# Patient Record
Sex: MALE | Race: WHITE
[De-identification: names, ages, dates, MRNs, and addresses within clinical notes are randomized per-mention and may not be internally consistent; named-entity substitution may affect disease eponyms.]

---

## 2018-04-26 ENCOUNTER — HOSPITAL ENCOUNTER (OUTPATIENT)
Dept: HOSPITAL 88 - DX | Age: 68
End: 2018-04-26
Attending: INTERNAL MEDICINE
Payer: MEDICARE

## 2018-04-26 DIAGNOSIS — R10.11: Primary | ICD-10-CM

## 2018-04-26 PROCEDURE — 74250 X-RAY XM SM INT 1CNTRST STD: CPT

## 2018-04-26 NOTE — DIAGNOSTIC IMAGING REPORT
PROCEDURE:SMALL BOWEL SERIES

COMPARISON:None.

INDICATIONS:BILATERAL UPPER ABDOMEN PAIN 

 

TEQUNIQUE: A  radiograph was performed. The patient was given 

barium to drink and sequential images of the abdomen were obtained 

through 3 hours until the contrast had reached the colon. Spot images 

of the small bowel and terminal ileum were obtained.

 

Fluoro time: 1.3 minutes

Fluoroscopic dose: 43.50 microGy

 

FINDINGS:

Unremarkable bowel gas pattern. No dilated bowel loops. Cholecystectomy 

clips are present.

 

Small bowel: 

Normal motility and caliber

No evidence of mass or mucosal abnormality. Also fold pattern appears 

normal. No evidence of effusion.

Normal terminal ileum.

 

CONCLUSION:

Normal small bowel follow-through.

 

Dictated by:  Reinier Montiel M.D. on 4/26/2018 at 12:58     

Electronically approved by:  Reinier Montiel M.D. on 4/26/2018 at 

12:58

## 2018-05-09 ENCOUNTER — HOSPITAL ENCOUNTER (OUTPATIENT)
Dept: HOSPITAL 88 - MRI | Age: 68
End: 2018-05-09
Attending: INTERNAL MEDICINE
Payer: MEDICARE

## 2018-05-09 DIAGNOSIS — K22.70: Primary | ICD-10-CM

## 2018-05-09 LAB
BUN SERPL-MCNC: 9 MG/DL (ref 7–26)
BUN/CREAT SERPL: 8 (ref 6–25)
EGFRCR SERPLBLD CKD-EPI 2021: > 60 ML/MIN (ref 60–?)

## 2018-05-09 PROCEDURE — 84520 ASSAY OF UREA NITROGEN: CPT

## 2018-05-09 PROCEDURE — 36415 COLL VENOUS BLD VENIPUNCTURE: CPT

## 2018-05-09 PROCEDURE — 74183 MRI ABD W/O CNTR FLWD CNTR: CPT

## 2018-05-09 PROCEDURE — 82565 ASSAY OF CREATININE: CPT

## 2018-05-09 NOTE — DIAGNOSTIC IMAGING REPORT
EXAM: MRI of the abdomen with and without contrast.



INDICATION: Abdominal pain.



COMPARISON:   None.



TECHNIQUE: Multiplanar and multisequence imaging was performed of the abdomen.

T1 and T2-weighted images were obtained with and without contrast. T1-weighted

in and out-of-phase , Dynamic, post gadolinium T1-weighted spoiled gradient

echo scans. 



IV Contrast: 13 cc of MultiHance

Oral Contrast: None.

Medications: None



DISCUSSION:



Very limited study due to artifacts and motion.



LOWER THORAX: Borderline cardiomegaly.



HEPATOBILIARY:      No focal hepatic lesions. Common bile duct distention up to

0.9 cm, likely due to postcholecystectomy reservoir effects. 



GALLBLADDER: Surgically absent.



SPLEEN: No splenomegaly. 



PANCREAS: Atrophic. No focal masses or ductal dilatation.  



ADRENALS: No adrenal nodules    



KIDNEYS/URETERS: Kidneys enhance symmetrically.  No hydronephrosis. Multiple

right renal cysts, the largest in the inferior pole measuring 4.8 x 4.7 cm. The

posterior midpole 1.2 cm T1 and T2 hyperintense lesion without definite signs

of enhancement on subtraction postcontrast images, representing a hemorrhagic

cyst (series 5, image 21).     



GI TRACT: Visualized bowel loops are grossly unremarkable. No evidence of bowel

obstruction.

Appendix is visualized on coronal images and appears unremarkable.



LYMPH NODES: No lymphadenopathy.



VESSELS: Moderate to severe atherosclerotic disease of abdominal aorta.



PERITONEUM / RETROPERITONEUM: No free air or fluid.



BONES: Unremarkable. Degenerative changes of lumbar spine.



SOFT TISSUES: Unremarkable.           



IMPRESSION:

1.  Very limited study.

2.  No definite evidence of acute inflammatory process in the abdomen/pelvis.

If clinical symptoms persist, consider obtaining CT with contrast for further

evaluation.

3.  Status post cholecystectomy with mild common bile duct dilatation, likely

due to reservoir effects.

4.  Multiple right renal cysts as described above.



Signed by: Dr. Preston Mahoney MD on 5/9/2018 11:49 AM

## 2018-10-01 ENCOUNTER — HOSPITAL ENCOUNTER (OUTPATIENT)
Dept: HOSPITAL 88 - ER | Age: 68
Discharge: HOME | End: 2018-10-01
Attending: PLASTIC SURGERY
Payer: MEDICARE

## 2018-10-01 VITALS — SYSTOLIC BLOOD PRESSURE: 147 MMHG | DIASTOLIC BLOOD PRESSURE: 62 MMHG

## 2018-10-01 VITALS — BODY MASS INDEX: 21.97 KG/M2 | WEIGHT: 140 LBS | HEIGHT: 67 IN
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
## 2018-10-01 DIAGNOSIS — F17.210: ICD-10-CM

## 2018-10-01 DIAGNOSIS — Z95.1: ICD-10-CM

## 2018-10-01 DIAGNOSIS — E03.9: ICD-10-CM

## 2018-10-01 DIAGNOSIS — S62.630A: ICD-10-CM

## 2018-10-01 DIAGNOSIS — S68.021A: Primary | ICD-10-CM
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
## 2018-10-01 DIAGNOSIS — I25.2: ICD-10-CM

## 2018-10-01 PROCEDURE — 11730 AVULSION NAIL PLATE SIMPLE 1: CPT

## 2018-10-01 PROCEDURE — 73140 X-RAY EXAM OF FINGER(S): CPT

## 2018-10-01 PROCEDURE — 26765 TREAT FINGER FRACTURE EACH: CPT

## 2018-10-01 PROCEDURE — 99284 EMERGENCY DEPT VISIT MOD MDM: CPT

## 2018-10-01 NOTE — OPERATIVE REPORT
DATE OF PROCEDURE:  October 01, 2018 

 

PREOPERATIVE DIAGNOSES

1. Table saw injury, right thumb.  

2. Incomplete amputation, right thumb, distal phalanx.  

3. Open fracture in distal phalanx.



POSTOPERATIVE DIAGNOSES

1. Table saw injury, right thumb.  

2. Incomplete amputation, right thumb, distal phalanx.  

3. Open fracture in distal phalanx.



PROCEDURES PERFORMED: 

1. Debridement of distal phalanx, soft tissues.  

2. Repair of saw injury, right thumb, incomplete amputation.  



ANESTHESIA:  General. 



HISTORY:   The patient is a 67-year-old right hand dominant male who 

sustained a table saw injury to the right thumb last evening.  He did not 

come to the emergency room until today around noon.   He was seen urgently 

and evaluation was performed.  It was noted that the tissues were 

vascularized, but dusky, and he is now being taken to the OR urgently for 

repair of incomplete amputation/table saw injury of right thumb.  Risks, 

benefits and alternatives of treatment were discussed with the patient.  He 

is prepared to undergo the procedure as outlined.



DETAILS OF PROCEDURE:  Patient was marked preoperatively in the holding 

area.  He was brought to the operating theater and after the induction of 

adequate general anesthesia he was prepped and draped in a supine position. 

 A time out was performed.  The right upper extremity was exsanguinated and 

tourniquet was inflated to a pressure of 250 mmHg.  The procedure was begun 

by sharply debriding all the devitalized soft tissues on the volar aspect 

of the distal phalanx from the IP flexion crease all the way to the distal 

tip.  At this point, copious irrigation was done to remove all particulate 

wood and bony fragments.  Once this was performed, the C-arm fluoroscope 

was brought in and the major fracture fragments are aligned as best 

possible and transfixed with 0.28  K wires transversely.  At this point, 

the soft tissues were then repaired using 5-0 nylon in interrupted fashion. 

 Once the volar soft tissues had been repaired, attention was turned to the 

nail bed.  The nail plate was completely transected sagittally in its 

midline.  The nail plate was removed and the nail bed was then gently 

debrided of all devitalized tissue.  A 5-0 chromic was then used in 

interrupted fashion to approximate the nail bed as well as the germinal 

matrix underneath the eponychial fold.  An INRO nail stent was then 

fabricated and secured to the eponychial fold in the distal tip of the 

thumb using 5-0 nylon suture.  The tourniquet was deflated.  The thumb 

pinked up and the wound was noted to be hemostatic.  Bactroban ointment, 

Xeroform gauze was then placed on the thumb.  Sterile dressings were 

applied and then a foam aluminum splint was added for external protection 

and held in place with Coban.  The patient tolerated the procedure well.  

Estimated blood loss during the procedure was negligible.  A 0.5% plain 

Marcaine field block was performed at the base of the right thumb for 

postoperative analgesia.  Approximately 6 mL was used.  Patient was 

returned to recovery room in satisfactory condition and discharged with a 

postoperative instruction sheet as well as a followup appointment. 











DD:  10/01/2018 17:55

DT:  10/01/2018 18:16

Job#:  [id number]

## 2018-10-01 NOTE — DIAGNOSTIC IMAGING REPORT
Exam:  Right thumb 3 views



History:  Injury



Comparison: None.



Findings: 



Acute markedly comminuted fracture of the entirety of the first distal phalanx

with extension to the interphalangeal joint. Overlying soft tissue lacerations.

The proximal phalanx appears intact.



Impression:



Markedly comminuted fracture of the first distal phalanx with associated soft

tissue laceration.





Signed by: Dr. Trevor Campbell M.D. on 10/1/2018 11:25 AM

## 2022-08-14 ENCOUNTER — HOSPITAL ENCOUNTER (EMERGENCY)
Dept: HOSPITAL 88 - ER | Age: 72
Discharge: HOME | End: 2022-08-14
Payer: MEDICARE

## 2022-08-14 VITALS — HEIGHT: 67 IN | WEIGHT: 140 LBS | BODY MASS INDEX: 21.97 KG/M2

## 2022-08-14 DIAGNOSIS — F17.210: ICD-10-CM

## 2022-08-14 DIAGNOSIS — E78.5: ICD-10-CM

## 2022-08-14 DIAGNOSIS — R05.9: ICD-10-CM

## 2022-08-14 DIAGNOSIS — F41.9: ICD-10-CM

## 2022-08-14 DIAGNOSIS — J40: ICD-10-CM

## 2022-08-14 DIAGNOSIS — Z87.01: ICD-10-CM

## 2022-08-14 DIAGNOSIS — I10: ICD-10-CM

## 2022-08-14 DIAGNOSIS — Z95.1: ICD-10-CM

## 2022-08-14 DIAGNOSIS — R06.02: Primary | ICD-10-CM

## 2022-08-14 DIAGNOSIS — J44.1: ICD-10-CM

## 2022-08-14 LAB
ALBUMIN SERPL-MCNC: 2.9 G/DL (ref 3.5–5)
ALBUMIN/GLOB SERPL: 0.8 {RATIO} (ref 0.8–2)
ALP SERPL-CCNC: 84 IU/L (ref 40–150)
ALT SERPL-CCNC: 18 IU/L (ref 0–55)
ANION GAP SERPL CALC-SCNC: 13.2 MMOL/L (ref 8–16)
BASOPHILS # BLD AUTO: 0 10*3/UL (ref 0–0.1)
BASOPHILS NFR BLD AUTO: 0.2 % (ref 0–1)
BUN SERPL-MCNC: 21 MG/DL (ref 7–26)
BUN/CREAT SERPL: 18 (ref 6–25)
CALCIUM SERPL-MCNC: 8.6 MG/DL (ref 8.4–10.2)
CHLORIDE SERPL-SCNC: 99 MMOL/L (ref 98–107)
CK MB SERPL-MCNC: 2.1 NG/ML (ref 0–5)
CK SERPL-CCNC: 39 IU/L (ref 30–200)
CO2 SERPL-SCNC: 28 MMOL/L (ref 22–29)
DEPRECATED APTT PLAS QN: 30 SECONDS (ref 23.8–35.5)
DEPRECATED INR PLAS: 0.98
DEPRECATED NEUTROPHILS # BLD AUTO: 8.8 10*3/UL (ref 2.1–6.9)
EOSINOPHIL # BLD AUTO: 0.2 10*3/UL (ref 0–0.4)
EOSINOPHIL NFR BLD AUTO: 1.3 % (ref 0–6)
ERYTHROCYTE [DISTWIDTH] IN CORD BLOOD: 12.4 % (ref 11.7–14.4)
GLOBULIN PLAS-MCNC: 3.8 G/DL (ref 2.3–3.5)
GLUCOSE SERPLBLD-MCNC: 108 MG/DL (ref 74–118)
HCT VFR BLD AUTO: 41 % (ref 38.2–49.6)
HGB BLD-MCNC: 13.6 G/DL (ref 14–18)
LYMPHOCYTES # BLD: 1.2 10*3/UL (ref 1–3.2)
LYMPHOCYTES NFR BLD AUTO: 10.8 % (ref 18–39.1)
MCH RBC QN AUTO: 31.3 PG (ref 28–32)
MCHC RBC AUTO-ENTMCNC: 33.2 G/DL (ref 31–35)
MCV RBC AUTO: 94.5 FL (ref 81–99)
MONOCYTES # BLD AUTO: 1 10*3/UL (ref 0.2–0.8)
MONOCYTES NFR BLD AUTO: 8.5 % (ref 4.4–11.3)
NEUTS SEG NFR BLD AUTO: 78.6 % (ref 38.7–80)
PLATELET # BLD AUTO: 356 X10E3/UL (ref 140–360)
POTASSIUM SERPL-SCNC: 4.2 MMOL/L (ref 3.5–5.1)
PROTHROMBIN TIME: 13.9 SECONDS (ref 11.9–14.5)
RBC # BLD AUTO: 4.34 X10E6/UL (ref 4.3–5.7)
SODIUM SERPL-SCNC: 136 MMOL/L (ref 136–145)

## 2022-08-14 PROCEDURE — 0223U NFCT DS 22 TRGT SARS-COV-2: CPT

## 2022-08-14 PROCEDURE — 82550 ASSAY OF CK (CPK): CPT

## 2022-08-14 PROCEDURE — 84484 ASSAY OF TROPONIN QUANT: CPT

## 2022-08-14 PROCEDURE — 80053 COMPREHEN METABOLIC PANEL: CPT

## 2022-08-14 PROCEDURE — 94799 UNLISTED PULMONARY SVC/PX: CPT

## 2022-08-14 PROCEDURE — 36415 COLL VENOUS BLD VENIPUNCTURE: CPT

## 2022-08-14 PROCEDURE — 93005 ELECTROCARDIOGRAM TRACING: CPT

## 2022-08-14 PROCEDURE — 71045 X-RAY EXAM CHEST 1 VIEW: CPT

## 2022-08-14 PROCEDURE — 85025 COMPLETE CBC W/AUTO DIFF WBC: CPT

## 2022-08-14 PROCEDURE — 94640 AIRWAY INHALATION TREATMENT: CPT

## 2022-08-14 PROCEDURE — 82553 CREATINE MB FRACTION: CPT

## 2022-08-14 PROCEDURE — 85730 THROMBOPLASTIN TIME PARTIAL: CPT

## 2022-08-14 PROCEDURE — 85379 FIBRIN DEGRADATION QUANT: CPT

## 2022-08-14 PROCEDURE — 85610 PROTHROMBIN TIME: CPT

## 2022-08-14 PROCEDURE — 83880 ASSAY OF NATRIURETIC PEPTIDE: CPT

## 2022-08-14 PROCEDURE — 71260 CT THORAX DX C+: CPT

## 2022-08-14 PROCEDURE — 99284 EMERGENCY DEPT VISIT MOD MDM: CPT

## 2022-08-14 PROCEDURE — 87040 BLOOD CULTURE FOR BACTERIA: CPT

## 2022-08-14 PROCEDURE — 83605 ASSAY OF LACTIC ACID: CPT

## 2023-05-02 ENCOUNTER — HOSPITAL ENCOUNTER (INPATIENT)
Dept: HOSPITAL 88 - ER | Age: 73
LOS: 1 days | Discharge: HOME | DRG: 191 | End: 2023-05-03
Attending: STUDENT IN AN ORGANIZED HEALTH CARE EDUCATION/TRAINING PROGRAM | Admitting: STUDENT IN AN ORGANIZED HEALTH CARE EDUCATION/TRAINING PROGRAM
Payer: MEDICARE

## 2023-05-02 VITALS — HEIGHT: 67 IN | WEIGHT: 140 LBS | BODY MASS INDEX: 21.97 KG/M2

## 2023-05-02 VITALS — DIASTOLIC BLOOD PRESSURE: 65 MMHG | SYSTOLIC BLOOD PRESSURE: 98 MMHG

## 2023-05-02 DIAGNOSIS — J44.1: Primary | ICD-10-CM

## 2023-05-02 DIAGNOSIS — I25.10: ICD-10-CM

## 2023-05-02 DIAGNOSIS — I11.0: ICD-10-CM

## 2023-05-02 DIAGNOSIS — I25.2: ICD-10-CM

## 2023-05-02 DIAGNOSIS — I50.9: ICD-10-CM

## 2023-05-02 DIAGNOSIS — Z95.1: ICD-10-CM

## 2023-05-02 DIAGNOSIS — Z87.891: ICD-10-CM

## 2023-05-02 DIAGNOSIS — F41.9: ICD-10-CM

## 2023-05-02 DIAGNOSIS — Z90.49: ICD-10-CM

## 2023-05-02 DIAGNOSIS — F32.9: ICD-10-CM

## 2023-05-02 DIAGNOSIS — I48.91: ICD-10-CM

## 2023-05-02 DIAGNOSIS — E86.0: ICD-10-CM

## 2023-05-02 DIAGNOSIS — Z20.822: ICD-10-CM

## 2023-05-02 DIAGNOSIS — E78.5: ICD-10-CM

## 2023-05-02 DIAGNOSIS — D64.9: ICD-10-CM

## 2023-05-02 DIAGNOSIS — Z79.02: ICD-10-CM

## 2023-05-02 DIAGNOSIS — N17.9: ICD-10-CM

## 2023-05-02 DIAGNOSIS — Z79.51: ICD-10-CM

## 2023-05-02 LAB
ALBUMIN SERPL-MCNC: 3.3 G/DL (ref 3.5–5)
ALBUMIN/GLOB SERPL: 0.9 {RATIO} (ref 0.8–2)
ALP SERPL-CCNC: 62 IU/L (ref 40–150)
ALT SERPL-CCNC: 23 IU/L (ref 0–55)
ANION GAP SERPL CALC-SCNC: 15.2 MMOL/L (ref 8–16)
BASOPHILS # BLD AUTO: 0 10*3/UL (ref 0–0.1)
BASOPHILS NFR BLD AUTO: 0.3 % (ref 0–1)
BUN SERPL-MCNC: 22 MG/DL (ref 7–26)
BUN/CREAT SERPL: 15 (ref 6–25)
CALCIUM SERPL-MCNC: 9.1 MG/DL (ref 8.4–10.2)
CHLORIDE SERPL-SCNC: 102 MMOL/L (ref 98–107)
CK MB SERPL-MCNC: 6.6 NG/ML (ref 0–5)
CK SERPL-CCNC: 307 IU/L (ref 30–200)
CO2 SERPL-SCNC: 26 MMOL/L (ref 22–29)
DEPRECATED NEUTROPHILS # BLD AUTO: 6.9 10*3/UL (ref 2.1–6.9)
EOSINOPHIL # BLD AUTO: 0.1 10*3/UL (ref 0–0.4)
EOSINOPHIL NFR BLD AUTO: 1.2 % (ref 0–6)
ERYTHROCYTE [DISTWIDTH] IN CORD BLOOD: 12.2 % (ref 11.7–14.4)
GLOBULIN PLAS-MCNC: 3.8 G/DL (ref 2.3–3.5)
GLUCOSE SERPLBLD-MCNC: 110 MG/DL (ref 74–118)
HCT VFR BLD AUTO: 35.9 % (ref 38.2–49.6)
HGB BLD-MCNC: 12.3 G/DL (ref 14–18)
LYMPHOCYTES # BLD: 1 10*3/UL (ref 1–3.2)
LYMPHOCYTES NFR BLD AUTO: 10.9 % (ref 18–39.1)
MAGNESIUM SERPL-MCNC: 1.8 MG/DL (ref 1.3–2.1)
MCH RBC QN AUTO: 32.8 PG (ref 28–32)
MCHC RBC AUTO-ENTMCNC: 34.3 G/DL (ref 31–35)
MCV RBC AUTO: 95.7 FL (ref 81–99)
MONOCYTES # BLD AUTO: 1.2 10*3/UL (ref 0.2–0.8)
MONOCYTES NFR BLD AUTO: 12.6 % (ref 4.4–11.3)
NEUTS SEG NFR BLD AUTO: 74.5 % (ref 38.7–80)
PLATELET # BLD AUTO: 211 X10E3/UL (ref 140–360)
POTASSIUM SERPL-SCNC: 4.2 MMOL/L (ref 3.5–5.1)
RBC # BLD AUTO: 3.75 X10E6/UL (ref 4.3–5.7)
SODIUM SERPL-SCNC: 139 MMOL/L (ref 136–145)

## 2023-05-02 PROCEDURE — 94640 AIRWAY INHALATION TREATMENT: CPT

## 2023-05-02 PROCEDURE — 71045 X-RAY EXAM CHEST 1 VIEW: CPT

## 2023-05-02 PROCEDURE — 71260 CT THORAX DX C+: CPT

## 2023-05-02 PROCEDURE — 82550 ASSAY OF CK (CPK): CPT

## 2023-05-02 PROCEDURE — 94799 UNLISTED PULMONARY SVC/PX: CPT

## 2023-05-02 PROCEDURE — 36415 COLL VENOUS BLD VENIPUNCTURE: CPT

## 2023-05-02 PROCEDURE — 84484 ASSAY OF TROPONIN QUANT: CPT

## 2023-05-02 PROCEDURE — 80053 COMPREHEN METABOLIC PANEL: CPT

## 2023-05-02 PROCEDURE — 83735 ASSAY OF MAGNESIUM: CPT

## 2023-05-02 PROCEDURE — 83880 ASSAY OF NATRIURETIC PEPTIDE: CPT

## 2023-05-02 PROCEDURE — 82553 CREATINE MB FRACTION: CPT

## 2023-05-02 PROCEDURE — 99284 EMERGENCY DEPT VISIT MOD MDM: CPT

## 2023-05-02 PROCEDURE — 93005 ELECTROCARDIOGRAM TRACING: CPT

## 2023-05-02 PROCEDURE — 85025 COMPLETE CBC W/AUTO DIFF WBC: CPT

## 2023-05-02 RX ADMIN — Medication SCH ML: at 23:15

## 2023-05-03 VITALS — SYSTOLIC BLOOD PRESSURE: 80 MMHG | DIASTOLIC BLOOD PRESSURE: 64 MMHG

## 2023-05-03 VITALS — DIASTOLIC BLOOD PRESSURE: 67 MMHG | SYSTOLIC BLOOD PRESSURE: 103 MMHG

## 2023-05-03 VITALS — SYSTOLIC BLOOD PRESSURE: 100 MMHG | DIASTOLIC BLOOD PRESSURE: 56 MMHG

## 2023-05-03 LAB
ALBUMIN SERPL-MCNC: 2.6 G/DL (ref 3.5–5)
ALBUMIN/GLOB SERPL: 0.8 {RATIO} (ref 0.8–2)
ALP SERPL-CCNC: 54 IU/L (ref 40–150)
ALT SERPL-CCNC: 21 IU/L (ref 0–55)
ANION GAP SERPL CALC-SCNC: 11.4 MMOL/L (ref 8–16)
BASOPHILS # BLD AUTO: 0 10*3/UL (ref 0–0.1)
BASOPHILS NFR BLD AUTO: 0.4 % (ref 0–1)
BUN SERPL-MCNC: 21 MG/DL (ref 7–26)
BUN/CREAT SERPL: 18 (ref 6–25)
CALCIUM SERPL-MCNC: 8.2 MG/DL (ref 8.4–10.2)
CHLORIDE SERPL-SCNC: 108 MMOL/L (ref 98–107)
CK MB SERPL-MCNC: 6.2 NG/ML (ref 0–5)
CK SERPL-CCNC: 237 IU/L (ref 30–200)
CO2 SERPL-SCNC: 22 MMOL/L (ref 22–29)
DEPRECATED NEUTROPHILS # BLD AUTO: 6.6 10*3/UL (ref 2.1–6.9)
EOSINOPHIL # BLD AUTO: 0 10*3/UL (ref 0–0.4)
EOSINOPHIL NFR BLD AUTO: 0 % (ref 0–6)
ERYTHROCYTE [DISTWIDTH] IN CORD BLOOD: 11.9 % (ref 11.7–14.4)
GLOBULIN PLAS-MCNC: 3.2 G/DL (ref 2.3–3.5)
GLUCOSE SERPLBLD-MCNC: 141 MG/DL (ref 74–118)
HCT VFR BLD AUTO: 32.1 % (ref 38.2–49.6)
HGB BLD-MCNC: 10.8 G/DL (ref 14–18)
LYMPHOCYTES # BLD: 0.7 10*3/UL (ref 1–3.2)
LYMPHOCYTES NFR BLD AUTO: 9.2 % (ref 18–39.1)
MCH RBC QN AUTO: 32.6 PG (ref 28–32)
MCHC RBC AUTO-ENTMCNC: 33.6 G/DL (ref 31–35)
MCV RBC AUTO: 97 FL (ref 81–99)
MONOCYTES # BLD AUTO: 0.6 10*3/UL (ref 0.2–0.8)
MONOCYTES NFR BLD AUTO: 7.8 % (ref 4.4–11.3)
NEUTS SEG NFR BLD AUTO: 81.4 % (ref 38.7–80)
PLATELET # BLD AUTO: 197 X10E3/UL (ref 140–360)
POTASSIUM SERPL-SCNC: 4.4 MMOL/L (ref 3.5–5.1)
RBC # BLD AUTO: 3.31 X10E6/UL (ref 4.3–5.7)
SODIUM SERPL-SCNC: 137 MMOL/L (ref 136–145)

## 2023-05-03 RX ADMIN — Medication SCH ML: at 07:43

## 2023-05-03 RX ADMIN — Medication SCH ML: at 12:46

## 2023-05-06 ENCOUNTER — HOSPITAL ENCOUNTER (INPATIENT)
Dept: HOSPITAL 88 - ER | Age: 73
LOS: 3 days | Discharge: HOME | DRG: 190 | End: 2023-05-09
Attending: INTERNAL MEDICINE | Admitting: INTERNAL MEDICINE
Payer: MEDICARE

## 2023-05-06 VITALS
HEART RATE: 80 BPM | RESPIRATION RATE: 18 BRPM | DIASTOLIC BLOOD PRESSURE: 63 MMHG | SYSTOLIC BLOOD PRESSURE: 100 MMHG | OXYGEN SATURATION: 100 % | TEMPERATURE: 97.6 F

## 2023-05-06 VITALS — HEIGHT: 66 IN | BODY MASS INDEX: 22.5 KG/M2 | WEIGHT: 140 LBS

## 2023-05-06 VITALS — OXYGEN SATURATION: 98 % | HEART RATE: 96 BPM | RESPIRATION RATE: 22 BRPM

## 2023-05-06 VITALS
DIASTOLIC BLOOD PRESSURE: 63 MMHG | TEMPERATURE: 97.6 F | HEART RATE: 80 BPM | OXYGEN SATURATION: 100 % | RESPIRATION RATE: 18 BRPM | SYSTOLIC BLOOD PRESSURE: 100 MMHG

## 2023-05-06 VITALS
SYSTOLIC BLOOD PRESSURE: 100 MMHG | DIASTOLIC BLOOD PRESSURE: 63 MMHG | RESPIRATION RATE: 18 BRPM | TEMPERATURE: 97.6 F | HEART RATE: 80 BPM | OXYGEN SATURATION: 96 %

## 2023-05-06 DIAGNOSIS — Z87.891: ICD-10-CM

## 2023-05-06 DIAGNOSIS — Z95.1: ICD-10-CM

## 2023-05-06 DIAGNOSIS — R00.0: ICD-10-CM

## 2023-05-06 DIAGNOSIS — Z90.49: ICD-10-CM

## 2023-05-06 DIAGNOSIS — D63.1: ICD-10-CM

## 2023-05-06 DIAGNOSIS — I13.0: ICD-10-CM

## 2023-05-06 DIAGNOSIS — N18.2: ICD-10-CM

## 2023-05-06 DIAGNOSIS — J22: ICD-10-CM

## 2023-05-06 DIAGNOSIS — J96.90: ICD-10-CM

## 2023-05-06 DIAGNOSIS — J18.9: ICD-10-CM

## 2023-05-06 DIAGNOSIS — E78.5: ICD-10-CM

## 2023-05-06 DIAGNOSIS — I50.33: ICD-10-CM

## 2023-05-06 DIAGNOSIS — I48.0: ICD-10-CM

## 2023-05-06 DIAGNOSIS — E03.9: ICD-10-CM

## 2023-05-06 DIAGNOSIS — I25.10: ICD-10-CM

## 2023-05-06 DIAGNOSIS — J43.9: Primary | ICD-10-CM

## 2023-05-06 DIAGNOSIS — I71.9: ICD-10-CM

## 2023-05-06 DIAGNOSIS — F32.A: ICD-10-CM

## 2023-05-06 DIAGNOSIS — F41.9: ICD-10-CM

## 2023-05-06 DIAGNOSIS — I25.2: ICD-10-CM

## 2023-05-06 LAB
ALBUMIN SERPL-MCNC: 3.1 G/DL (ref 3.5–5)
ALBUMIN/GLOB SERPL: 0.7 {RATIO} (ref 0.8–2)
ALP SERPL-CCNC: 59 IU/L (ref 40–150)
ALT SERPL-CCNC: 30 IU/L (ref 0–55)
ANION GAP SERPL CALC-SCNC: 16.4 MMOL/L (ref 8–16)
BASOPHILS # BLD AUTO: 0.1 10*3/UL (ref 0–0.1)
BASOPHILS NFR BLD AUTO: 0.3 % (ref 0–1)
BUN SERPL-MCNC: 16 MG/DL (ref 7–26)
BUN/CREAT SERPL: 12 (ref 6–25)
CALCIUM SERPL-MCNC: 9.6 MG/DL (ref 8.4–10.2)
CHLORIDE SERPL-SCNC: 100 MMOL/L (ref 98–107)
CK MB SERPL-MCNC: 5.6 NG/ML (ref 0–5)
CK SERPL-CCNC: 396 IU/L (ref 30–200)
CO2 SERPL-SCNC: 25 MMOL/L (ref 22–29)
DEPRECATED NEUTROPHILS # BLD AUTO: 15.6 10*3/UL (ref 2.1–6.9)
EOSINOPHIL # BLD AUTO: 0.1 10*3/UL (ref 0–0.4)
EOSINOPHIL NFR BLD AUTO: 0.3 % (ref 0–6)
ERYTHROCYTE [DISTWIDTH] IN CORD BLOOD: 12.7 % (ref 11.7–14.4)
GLOBULIN PLAS-MCNC: 4.3 G/DL (ref 2.3–3.5)
GLUCOSE SERPLBLD-MCNC: 98 MG/DL (ref 74–118)
HCT VFR BLD AUTO: 37.4 % (ref 38.2–49.6)
HGB BLD-MCNC: 12.5 G/DL (ref 14–18)
LYMPHOCYTES # BLD: 1.6 10*3/UL (ref 1–3.2)
LYMPHOCYTES NFR BLD AUTO: 8.5 % (ref 18–39.1)
MCH RBC QN AUTO: 32.5 PG (ref 28–32)
MCHC RBC AUTO-ENTMCNC: 33.4 G/DL (ref 31–35)
MCV RBC AUTO: 97.1 FL (ref 81–99)
MONOCYTES # BLD AUTO: 1 10*3/UL (ref 0.2–0.8)
MONOCYTES NFR BLD AUTO: 5.5 % (ref 4.4–11.3)
NEUTS SEG NFR BLD AUTO: 82.8 % (ref 38.7–80)
PLATELET # BLD AUTO: 287 X10E3/UL (ref 140–360)
POTASSIUM SERPL-SCNC: 4.4 MMOL/L (ref 3.5–5.1)
RBC # BLD AUTO: 3.85 X10E6/UL (ref 4.3–5.7)
SODIUM SERPL-SCNC: 137 MMOL/L (ref 136–145)

## 2023-05-06 PROCEDURE — 82550 ASSAY OF CK (CPK): CPT

## 2023-05-06 PROCEDURE — 99284 EMERGENCY DEPT VISIT MOD MDM: CPT

## 2023-05-06 PROCEDURE — 36415 COLL VENOUS BLD VENIPUNCTURE: CPT

## 2023-05-06 PROCEDURE — 93005 ELECTROCARDIOGRAM TRACING: CPT

## 2023-05-06 PROCEDURE — 82553 CREATINE MB FRACTION: CPT

## 2023-05-06 PROCEDURE — 87040 BLOOD CULTURE FOR BACTERIA: CPT

## 2023-05-06 PROCEDURE — 84484 ASSAY OF TROPONIN QUANT: CPT

## 2023-05-06 PROCEDURE — 71045 X-RAY EXAM CHEST 1 VIEW: CPT

## 2023-05-06 PROCEDURE — 94799 UNLISTED PULMONARY SVC/PX: CPT

## 2023-05-06 PROCEDURE — 80053 COMPREHEN METABOLIC PANEL: CPT

## 2023-05-06 PROCEDURE — 94640 AIRWAY INHALATION TREATMENT: CPT

## 2023-05-06 PROCEDURE — 93306 TTE W/DOPPLER COMPLETE: CPT

## 2023-05-06 PROCEDURE — 83880 ASSAY OF NATRIURETIC PEPTIDE: CPT

## 2023-05-06 PROCEDURE — 85025 COMPLETE CBC W/AUTO DIFF WBC: CPT

## 2023-05-06 PROCEDURE — 83605 ASSAY OF LACTIC ACID: CPT

## 2023-05-06 RX ADMIN — Medication SCH ML: at 22:28

## 2023-05-07 VITALS
TEMPERATURE: 97.6 F | RESPIRATION RATE: 21 BRPM | SYSTOLIC BLOOD PRESSURE: 149 MMHG | HEART RATE: 55 BPM | OXYGEN SATURATION: 97 % | DIASTOLIC BLOOD PRESSURE: 56 MMHG

## 2023-05-07 VITALS
OXYGEN SATURATION: 99 % | DIASTOLIC BLOOD PRESSURE: 42 MMHG | HEART RATE: 86 BPM | RESPIRATION RATE: 18 BRPM | SYSTOLIC BLOOD PRESSURE: 90 MMHG | TEMPERATURE: 97.9 F

## 2023-05-07 VITALS
HEART RATE: 82 BPM | SYSTOLIC BLOOD PRESSURE: 142 MMHG | OXYGEN SATURATION: 98 % | DIASTOLIC BLOOD PRESSURE: 62 MMHG | TEMPERATURE: 97.1 F | RESPIRATION RATE: 18 BRPM

## 2023-05-07 VITALS
SYSTOLIC BLOOD PRESSURE: 126 MMHG | OXYGEN SATURATION: 96 % | TEMPERATURE: 97.8 F | RESPIRATION RATE: 17 BRPM | DIASTOLIC BLOOD PRESSURE: 56 MMHG | HEART RATE: 70 BPM

## 2023-05-07 VITALS
TEMPERATURE: 97.6 F | DIASTOLIC BLOOD PRESSURE: 56 MMHG | SYSTOLIC BLOOD PRESSURE: 149 MMHG | OXYGEN SATURATION: 97 % | RESPIRATION RATE: 21 BRPM | HEART RATE: 58 BPM

## 2023-05-07 VITALS — RESPIRATION RATE: 18 BRPM | HEART RATE: 74 BPM | OXYGEN SATURATION: 100 %

## 2023-05-07 VITALS
HEART RATE: 71 BPM | OXYGEN SATURATION: 98 % | TEMPERATURE: 98.1 F | DIASTOLIC BLOOD PRESSURE: 63 MMHG | RESPIRATION RATE: 17 BRPM | SYSTOLIC BLOOD PRESSURE: 101 MMHG

## 2023-05-07 VITALS
RESPIRATION RATE: 18 BRPM | DIASTOLIC BLOOD PRESSURE: 56 MMHG | TEMPERATURE: 97.6 F | HEART RATE: 52 BPM | OXYGEN SATURATION: 96 % | SYSTOLIC BLOOD PRESSURE: 148 MMHG

## 2023-05-07 VITALS
SYSTOLIC BLOOD PRESSURE: 100 MMHG | HEART RATE: 80 BPM | RESPIRATION RATE: 18 BRPM | TEMPERATURE: 97.6 F | DIASTOLIC BLOOD PRESSURE: 63 MMHG | OXYGEN SATURATION: 96 %

## 2023-05-07 VITALS — OXYGEN SATURATION: 98 % | HEART RATE: 81 BPM | RESPIRATION RATE: 16 BRPM

## 2023-05-07 VITALS — HEART RATE: 91 BPM | OXYGEN SATURATION: 97 % | RESPIRATION RATE: 18 BRPM

## 2023-05-07 VITALS — RESPIRATION RATE: 18 BRPM | HEART RATE: 68 BPM | OXYGEN SATURATION: 98 %

## 2023-05-07 VITALS
OXYGEN SATURATION: 99 % | RESPIRATION RATE: 18 BRPM | DIASTOLIC BLOOD PRESSURE: 42 MMHG | TEMPERATURE: 97.9 F | SYSTOLIC BLOOD PRESSURE: 90 MMHG | HEART RATE: 86 BPM

## 2023-05-07 VITALS — RESPIRATION RATE: 20 BRPM | HEART RATE: 96 BPM | OXYGEN SATURATION: 96 %

## 2023-05-07 VITALS
SYSTOLIC BLOOD PRESSURE: 101 MMHG | DIASTOLIC BLOOD PRESSURE: 63 MMHG | HEART RATE: 64 BPM | OXYGEN SATURATION: 96 % | RESPIRATION RATE: 22 BRPM | TEMPERATURE: 98.1 F

## 2023-05-07 VITALS — HEART RATE: 61 BPM | OXYGEN SATURATION: 97 % | RESPIRATION RATE: 16 BRPM

## 2023-05-07 LAB
ALBUMIN SERPL-MCNC: 2.5 G/DL (ref 3.5–5)
ALBUMIN/GLOB SERPL: 0.7 {RATIO} (ref 0.8–2)
ALP SERPL-CCNC: 77 IU/L (ref 40–150)
ALT SERPL-CCNC: 23 IU/L (ref 0–55)
ANION GAP SERPL CALC-SCNC: 13.5 MMOL/L (ref 8–16)
BASOPHILS # BLD AUTO: 0.1 10*3/UL (ref 0–0.1)
BASOPHILS NFR BLD AUTO: 0.3 % (ref 0–1)
BUN SERPL-MCNC: 19 MG/DL (ref 7–26)
BUN/CREAT SERPL: 17 (ref 6–25)
CALCIUM SERPL-MCNC: 8.5 MG/DL (ref 8.4–10.2)
CHLORIDE SERPL-SCNC: 103 MMOL/L (ref 98–107)
CK MB SERPL-MCNC: 4.1 NG/ML (ref 0–5)
CK MB SERPL-MCNC: 7.4 NG/ML (ref 0–5)
CK SERPL-CCNC: 209 IU/L (ref 30–200)
CK SERPL-CCNC: 339 IU/L (ref 30–200)
CO2 SERPL-SCNC: 24 MMOL/L (ref 22–29)
DEPRECATED NEUTROPHILS # BLD AUTO: 16.5 10*3/UL (ref 2.1–6.9)
EOSINOPHIL # BLD AUTO: 0 10*3/UL (ref 0–0.4)
EOSINOPHIL NFR BLD AUTO: 0 % (ref 0–6)
ERYTHROCYTE [DISTWIDTH] IN CORD BLOOD: 12.5 % (ref 11.7–14.4)
GLOBULIN PLAS-MCNC: 3.8 G/DL (ref 2.3–3.5)
GLUCOSE SERPLBLD-MCNC: 145 MG/DL (ref 74–118)
HCT VFR BLD AUTO: 33.6 % (ref 38.2–49.6)
HGB BLD-MCNC: 11 G/DL (ref 14–18)
LYMPHOCYTES # BLD: 0.6 10*3/UL (ref 1–3.2)
LYMPHOCYTES NFR BLD AUTO: 3.3 % (ref 18–39.1)
MCH RBC QN AUTO: 32.2 PG (ref 28–32)
MCHC RBC AUTO-ENTMCNC: 32.7 G/DL (ref 31–35)
MCV RBC AUTO: 98.2 FL (ref 81–99)
MONOCYTES # BLD AUTO: 0.2 10*3/UL (ref 0.2–0.8)
MONOCYTES NFR BLD AUTO: 1.2 % (ref 4.4–11.3)
NEUTS SEG NFR BLD AUTO: 93.4 % (ref 38.7–80)
PLATELET # BLD AUTO: 265 X10E3/UL (ref 140–360)
POTASSIUM SERPL-SCNC: 4.5 MMOL/L (ref 3.5–5.1)
RBC # BLD AUTO: 3.42 X10E6/UL (ref 4.3–5.7)
SODIUM SERPL-SCNC: 136 MMOL/L (ref 136–145)

## 2023-05-07 RX ADMIN — Medication SCH ML: at 19:38

## 2023-05-07 RX ADMIN — Medication SCH ML: at 02:12

## 2023-05-07 RX ADMIN — Medication SCH ML: at 07:58

## 2023-05-07 RX ADMIN — Medication SCH ML: at 22:28

## 2023-05-07 RX ADMIN — Medication SCH MG: at 20:20

## 2023-05-07 RX ADMIN — BUDESONIDE AND FORMOTEROL FUMARATE DIHYDRATE SCH EA: 160; 4.5 AEROSOL RESPIRATORY (INHALATION) at 19:50

## 2023-05-07 RX ADMIN — Medication SCH ML: at 17:05

## 2023-05-07 RX ADMIN — Medication SCH ML: at 13:28

## 2023-05-07 RX ADMIN — Medication SCH ML: at 07:59

## 2023-05-07 RX ADMIN — Medication SCH ML: at 09:00

## 2023-05-07 RX ADMIN — Medication SCH ML: at 02:10

## 2023-05-07 RX ADMIN — Medication SCH ML: at 13:31

## 2023-05-08 VITALS
RESPIRATION RATE: 16 BRPM | TEMPERATURE: 97.4 F | OXYGEN SATURATION: 94 % | DIASTOLIC BLOOD PRESSURE: 70 MMHG | HEART RATE: 64 BPM | SYSTOLIC BLOOD PRESSURE: 131 MMHG

## 2023-05-08 VITALS
OXYGEN SATURATION: 99 % | HEART RATE: 99 BPM | SYSTOLIC BLOOD PRESSURE: 124 MMHG | RESPIRATION RATE: 18 BRPM | TEMPERATURE: 98.3 F | DIASTOLIC BLOOD PRESSURE: 70 MMHG

## 2023-05-08 VITALS — HEART RATE: 63 BPM | RESPIRATION RATE: 18 BRPM | OXYGEN SATURATION: 99 %

## 2023-05-08 VITALS
HEART RATE: 69 BPM | TEMPERATURE: 98.3 F | OXYGEN SATURATION: 99 % | RESPIRATION RATE: 18 BRPM | DIASTOLIC BLOOD PRESSURE: 62 MMHG | SYSTOLIC BLOOD PRESSURE: 134 MMHG

## 2023-05-08 VITALS — OXYGEN SATURATION: 96 % | HEART RATE: 78 BPM | RESPIRATION RATE: 16 BRPM

## 2023-05-08 VITALS
DIASTOLIC BLOOD PRESSURE: 65 MMHG | OXYGEN SATURATION: 97 % | TEMPERATURE: 98.3 F | HEART RATE: 85 BPM | RESPIRATION RATE: 18 BRPM | SYSTOLIC BLOOD PRESSURE: 93 MMHG

## 2023-05-08 VITALS
DIASTOLIC BLOOD PRESSURE: 99 MMHG | SYSTOLIC BLOOD PRESSURE: 120 MMHG | RESPIRATION RATE: 24 BRPM | HEART RATE: 71 BPM | OXYGEN SATURATION: 99 % | TEMPERATURE: 98.2 F

## 2023-05-08 VITALS — RESPIRATION RATE: 18 BRPM | HEART RATE: 86 BPM | OXYGEN SATURATION: 98 %

## 2023-05-08 VITALS
HEART RATE: 67 BPM | OXYGEN SATURATION: 96 % | TEMPERATURE: 97.4 F | DIASTOLIC BLOOD PRESSURE: 68 MMHG | RESPIRATION RATE: 18 BRPM | SYSTOLIC BLOOD PRESSURE: 128 MMHG

## 2023-05-08 VITALS
RESPIRATION RATE: 16 BRPM | HEART RATE: 64 BPM | TEMPERATURE: 97.4 F | SYSTOLIC BLOOD PRESSURE: 131 MMHG | OXYGEN SATURATION: 94 % | DIASTOLIC BLOOD PRESSURE: 70 MMHG

## 2023-05-08 VITALS
OXYGEN SATURATION: 97 % | HEART RATE: 77 BPM | DIASTOLIC BLOOD PRESSURE: 70 MMHG | RESPIRATION RATE: 24 BRPM | TEMPERATURE: 97.5 F | SYSTOLIC BLOOD PRESSURE: 120 MMHG

## 2023-05-08 VITALS — OXYGEN SATURATION: 97 % | HEART RATE: 68 BPM | RESPIRATION RATE: 18 BRPM

## 2023-05-08 LAB
BASOPHILS # BLD AUTO: 0 10*3/UL (ref 0–0.1)
BASOPHILS NFR BLD AUTO: 0.2 % (ref 0–1)
DEPRECATED NEUTROPHILS # BLD AUTO: 19.2 10*3/UL (ref 2.1–6.9)
EOSINOPHIL # BLD AUTO: 0 10*3/UL (ref 0–0.4)
EOSINOPHIL NFR BLD AUTO: 0 % (ref 0–6)
ERYTHROCYTE [DISTWIDTH] IN CORD BLOOD: 12.5 % (ref 11.7–14.4)
HCT VFR BLD AUTO: 34 % (ref 38.2–49.6)
HGB BLD-MCNC: 11.3 G/DL (ref 14–18)
LYMPHOCYTES # BLD: 0.6 10*3/UL (ref 1–3.2)
LYMPHOCYTES NFR BLD AUTO: 2.7 % (ref 18–39.1)
MCH RBC QN AUTO: 32.1 PG (ref 28–32)
MCHC RBC AUTO-ENTMCNC: 33.2 G/DL (ref 31–35)
MCV RBC AUTO: 96.6 FL (ref 81–99)
MONOCYTES # BLD AUTO: 0.4 10*3/UL (ref 0.2–0.8)
MONOCYTES NFR BLD AUTO: 2.1 % (ref 4.4–11.3)
NEUTS SEG NFR BLD AUTO: 93.8 % (ref 38.7–80)
NEUTS SEG NFR BLD MANUAL: 100 % (ref 40–74)
PLAT MORPH BLD: NORMAL
PLATELET # BLD AUTO: 280 X10E3/UL (ref 140–360)
PLATELET # BLD EST: ADEQUATE 10*3/UL
RBC # BLD AUTO: 3.52 X10E6/UL (ref 4.3–5.7)
RBC MORPH BLD: NORMAL

## 2023-05-08 RX ADMIN — Medication SCH ML: at 19:30

## 2023-05-08 RX ADMIN — Medication SCH ML: at 10:45

## 2023-05-08 RX ADMIN — METHYLPREDNISOLONE SODIUM SUCCINATE SCH MG: 40 INJECTION, POWDER, LYOPHILIZED, FOR SOLUTION INTRAMUSCULAR; INTRAVENOUS at 21:23

## 2023-05-08 RX ADMIN — Medication SCH ML: at 21:00

## 2023-05-08 RX ADMIN — Medication SCH ML: at 02:20

## 2023-05-08 RX ADMIN — Medication SCH ML: at 07:00

## 2023-05-08 RX ADMIN — BUDESONIDE AND FORMOTEROL FUMARATE DIHYDRATE SCH EA: 160; 4.5 AEROSOL RESPIRATORY (INHALATION) at 19:36

## 2023-05-08 RX ADMIN — Medication SCH MG: at 21:23

## 2023-05-08 RX ADMIN — LEVOTHYROXINE SODIUM SCH MCG: 0.11 TABLET ORAL at 05:57

## 2023-05-08 RX ADMIN — METHYLPREDNISOLONE SODIUM SUCCINATE SCH MG: 40 INJECTION, POWDER, LYOPHILIZED, FOR SOLUTION INTRAMUSCULAR; INTRAVENOUS at 10:09

## 2023-05-08 RX ADMIN — SPIRONOLACTONE SCH MG: 25 TABLET, FILM COATED ORAL at 08:00

## 2023-05-08 RX ADMIN — METOPROLOL SUCCINATE SCH MG: 25 TABLET, EXTENDED RELEASE ORAL at 08:06

## 2023-05-08 RX ADMIN — DULOXETINE HYDROCHLORIDE SCH MG: 30 CAPSULE, DELAYED RELEASE ORAL at 08:02

## 2023-05-08 RX ADMIN — ROPINIROLE SCH MG: 0.25 TABLET, FILM COATED ORAL at 10:11

## 2023-05-08 RX ADMIN — BUDESONIDE AND FORMOTEROL FUMARATE DIHYDRATE SCH EA: 160; 4.5 AEROSOL RESPIRATORY (INHALATION) at 07:59

## 2023-05-09 VITALS — OXYGEN SATURATION: 94 % | HEART RATE: 74 BPM | RESPIRATION RATE: 16 BRPM

## 2023-05-09 VITALS
DIASTOLIC BLOOD PRESSURE: 67 MMHG | TEMPERATURE: 97.8 F | RESPIRATION RATE: 19 BRPM | HEART RATE: 68 BPM | OXYGEN SATURATION: 94 % | SYSTOLIC BLOOD PRESSURE: 145 MMHG

## 2023-05-09 VITALS
TEMPERATURE: 98.2 F | RESPIRATION RATE: 20 BRPM | DIASTOLIC BLOOD PRESSURE: 60 MMHG | OXYGEN SATURATION: 100 % | HEART RATE: 68 BPM | SYSTOLIC BLOOD PRESSURE: 124 MMHG

## 2023-05-09 VITALS
RESPIRATION RATE: 16 BRPM | HEART RATE: 62 BPM | OXYGEN SATURATION: 96 % | SYSTOLIC BLOOD PRESSURE: 118 MMHG | TEMPERATURE: 97.2 F | DIASTOLIC BLOOD PRESSURE: 65 MMHG

## 2023-05-09 VITALS
SYSTOLIC BLOOD PRESSURE: 124 MMHG | RESPIRATION RATE: 20 BRPM | DIASTOLIC BLOOD PRESSURE: 60 MMHG | HEART RATE: 50 BPM | TEMPERATURE: 98.2 F | OXYGEN SATURATION: 100 %

## 2023-05-09 VITALS — RESPIRATION RATE: 18 BRPM | OXYGEN SATURATION: 96 % | HEART RATE: 58 BPM

## 2023-05-09 VITALS — RESPIRATION RATE: 18 BRPM | OXYGEN SATURATION: 94 % | HEART RATE: 58 BPM

## 2023-05-09 LAB
ALBUMIN SERPL-MCNC: 2.5 G/DL (ref 3.5–5)
ALBUMIN/GLOB SERPL: 0.7 {RATIO} (ref 0.8–2)
ALP SERPL-CCNC: 59 IU/L (ref 40–150)
ALT SERPL-CCNC: 46 IU/L (ref 0–55)
ANION GAP SERPL CALC-SCNC: 12.5 MMOL/L (ref 8–16)
BASOPHILS # BLD AUTO: 0 10*3/UL (ref 0–0.1)
BASOPHILS NFR BLD AUTO: 0.1 % (ref 0–1)
BUN SERPL-MCNC: 27 MG/DL (ref 7–26)
BUN/CREAT SERPL: 25 (ref 6–25)
CALCIUM SERPL-MCNC: 8.9 MG/DL (ref 8.4–10.2)
CHLORIDE SERPL-SCNC: 102 MMOL/L (ref 98–107)
CO2 SERPL-SCNC: 28 MMOL/L (ref 22–29)
DEPRECATED NEUTROPHILS # BLD AUTO: 16 10*3/UL (ref 2.1–6.9)
EOSINOPHIL # BLD AUTO: 0 10*3/UL (ref 0–0.4)
EOSINOPHIL NFR BLD AUTO: 0 % (ref 0–6)
ERYTHROCYTE [DISTWIDTH] IN CORD BLOOD: 12.3 % (ref 11.7–14.4)
GLOBULIN PLAS-MCNC: 3.7 G/DL (ref 2.3–3.5)
GLUCOSE SERPLBLD-MCNC: 161 MG/DL (ref 74–118)
HCT VFR BLD AUTO: 33.6 % (ref 38.2–49.6)
HGB BLD-MCNC: 11.1 G/DL (ref 14–18)
LYMPHOCYTES # BLD: 0.7 10*3/UL (ref 1–3.2)
LYMPHOCYTES NFR BLD AUTO: 4.2 % (ref 18–39.1)
MCH RBC QN AUTO: 31.9 PG (ref 28–32)
MCHC RBC AUTO-ENTMCNC: 33 G/DL (ref 31–35)
MCV RBC AUTO: 96.6 FL (ref 81–99)
MONOCYTES # BLD AUTO: 0.4 10*3/UL (ref 0.2–0.8)
MONOCYTES NFR BLD AUTO: 2 % (ref 4.4–11.3)
NEUTS SEG NFR BLD AUTO: 92.3 % (ref 38.7–80)
PLATELET # BLD AUTO: 296 X10E3/UL (ref 140–360)
POTASSIUM SERPL-SCNC: 4.5 MMOL/L (ref 3.5–5.1)
RBC # BLD AUTO: 3.48 X10E6/UL (ref 4.3–5.7)
SODIUM SERPL-SCNC: 138 MMOL/L (ref 136–145)

## 2023-05-09 RX ADMIN — Medication SCH ML: at 14:00

## 2023-05-09 RX ADMIN — Medication SCH ML: at 06:30

## 2023-05-09 RX ADMIN — Medication SCH ML: at 10:30

## 2023-05-09 RX ADMIN — BUDESONIDE AND FORMOTEROL FUMARATE DIHYDRATE SCH EA: 160; 4.5 AEROSOL RESPIRATORY (INHALATION) at 06:57

## 2023-05-09 RX ADMIN — METOPROLOL SUCCINATE SCH MG: 25 TABLET, EXTENDED RELEASE ORAL at 08:38

## 2023-05-09 RX ADMIN — ROPINIROLE SCH MG: 0.25 TABLET, FILM COATED ORAL at 08:37

## 2023-05-09 RX ADMIN — Medication SCH ML: at 00:00

## 2023-05-09 RX ADMIN — LEVOTHYROXINE SODIUM SCH MCG: 0.11 TABLET ORAL at 06:13

## 2023-05-09 RX ADMIN — DULOXETINE HYDROCHLORIDE SCH MG: 30 CAPSULE, DELAYED RELEASE ORAL at 08:37

## 2023-05-09 RX ADMIN — METHYLPREDNISOLONE SODIUM SUCCINATE SCH MG: 40 INJECTION, POWDER, LYOPHILIZED, FOR SOLUTION INTRAMUSCULAR; INTRAVENOUS at 08:37

## 2023-05-09 RX ADMIN — SPIRONOLACTONE SCH MG: 25 TABLET, FILM COATED ORAL at 08:37

## 2024-10-25 ENCOUNTER — HOSPITAL ENCOUNTER (OUTPATIENT)
Dept: HOSPITAL 88 - CT | Age: 74
End: 2024-10-25
Attending: INTERNAL MEDICINE
Payer: MEDICARE

## 2024-10-25 DIAGNOSIS — I65.23: Primary | ICD-10-CM

## 2024-10-25 LAB
BUN SERPL-MCNC: 10 MG/DL (ref 7–26)
BUN/CREAT SERPL: 8 (ref 6–25)
EGFRCR SERPLBLD CKD-EPI 2021: 56 ML/MIN (ref 60–?)

## 2024-10-25 PROCEDURE — 84520 ASSAY OF UREA NITROGEN: CPT

## 2024-10-25 PROCEDURE — 82565 ASSAY OF CREATININE: CPT

## 2024-10-25 PROCEDURE — 70496 CT ANGIOGRAPHY HEAD: CPT

## 2024-10-25 PROCEDURE — 70498 CT ANGIOGRAPHY NECK: CPT

## 2024-10-25 PROCEDURE — 36415 COLL VENOUS BLD VENIPUNCTURE: CPT
